# Patient Record
Sex: FEMALE | Race: WHITE | Employment: UNEMPLOYED | URBAN - METROPOLITAN AREA
[De-identification: names, ages, dates, MRNs, and addresses within clinical notes are randomized per-mention and may not be internally consistent; named-entity substitution may affect disease eponyms.]

---

## 2015-05-28 LAB
ALT SERPL-CCNC: 37 U/L (ref 7–45)
AST SERPL-CCNC: 21 U/L (ref 8–43)
HBA1C MFR BLD: 5.3 % (ref 4–5.6)
INR PPP: 1 (ref 0.9–1.1)
POTASSIUM SERPL-SCNC: 4.3 MMOL/L (ref 3.6–5.2)
TSH SERPL-ACNC: 2.5 MIU/L (ref 0.3–4.2)

## 2015-08-03 LAB — POTASSIUM SERPL-SCNC: 4 MMOL/L (ref 3.6–5.2)

## 2015-09-02 LAB
ALT SERPL-CCNC: 54 U/L (ref 7–45)
AST SERPL-CCNC: 24 U/L (ref 8–43)
CREAT SERPL-MCNC: 0.5 MG/DL (ref 0.6–1.1)
CREAT SERPL-MCNC: 0.6 MG/DL (ref 0.6–1.1)
GFR SERPL CREATININE-BSD FRML MDRD: >60 ML/MIN/1.73M2
GFR SERPL CREATININE-BSD FRML MDRD: >60 ML/MIN/1.73M2
POTASSIUM SERPL-SCNC: 4.7 MMOL/L (ref 3.6–5.2)
TSH SERPL-ACNC: 0.8 MIU/L (ref 0.3–4.2)

## 2015-11-25 LAB
ALT SERPL-CCNC: 111 U/L (ref 7–45)
AST SERPL-CCNC: 48 U/L (ref 8–43)
CREAT SERPL-MCNC: 0.6 MG/DL (ref 0.6–1.1)
GFR SERPL CREATININE-BSD FRML MDRD: >60 ML/MIN/1.73M2
INR PPP: 0.9 (ref 0.9–1.1)

## 2015-12-29 LAB
HEP C HIM: NORMAL
INR PPP: 0.9 (ref 0.9–1.1)

## 2016-03-14 LAB
ALT SERPL-CCNC: 144 U/L (ref 7–45)
AST SERPL-CCNC: 63 U/L (ref 8–43)

## 2016-10-20 LAB
CREAT SERPL-MCNC: 0.6 MG/DL (ref 0.6–1.1)
GFR SERPL CREATININE-BSD FRML MDRD: >60 ML/MIN/1.73M2

## 2017-03-27 ENCOUNTER — TRANSFERRED RECORDS (OUTPATIENT)
Dept: HEALTH INFORMATION MANAGEMENT | Facility: CLINIC | Age: 42
End: 2017-03-27

## 2017-03-27 LAB
ALT SERPL-CCNC: 32 U/L (ref 7–45)
CREAT SERPL-MCNC: 0.6 MG/DL (ref 0.6–1.1)
GFR SERPL CREATININE-BSD FRML MDRD: >60 ML/MIN/1.73M2

## 2017-04-14 ENCOUNTER — TRANSFERRED RECORDS (OUTPATIENT)
Dept: HEALTH INFORMATION MANAGEMENT | Facility: CLINIC | Age: 42
End: 2017-04-14

## 2017-10-01 ENCOUNTER — HEALTH MAINTENANCE LETTER (OUTPATIENT)
Age: 42
End: 2017-10-01

## 2017-10-20 ENCOUNTER — TRANSFERRED RECORDS (OUTPATIENT)
Dept: HEALTH INFORMATION MANAGEMENT | Facility: CLINIC | Age: 42
End: 2017-10-20

## 2017-12-29 ENCOUNTER — TRANSFERRED RECORDS (OUTPATIENT)
Dept: HEALTH INFORMATION MANAGEMENT | Facility: CLINIC | Age: 42
End: 2017-12-29

## 2018-03-15 ENCOUNTER — TRANSFERRED RECORDS (OUTPATIENT)
Dept: HEALTH INFORMATION MANAGEMENT | Facility: CLINIC | Age: 43
End: 2018-03-15

## 2018-03-15 LAB
ALT SERPL-CCNC: 22 U/L (ref 7–45)
AST SERPL-CCNC: 16 U/L (ref 8–43)
CHOLEST SERPL-MCNC: 189 MG/DL
CREAT SERPL-MCNC: 0.6 MG/DL (ref 0.6–1.1)
GFR SERPL CREATININE-BSD FRML MDRD: >60 ML/MIN/1.73M2
HBA1C MFR BLD: 5.1 % (ref 4–5.6)
HDLC SERPL-MCNC: 44 MG/DL
INR PPP: 1 (ref 0.9–1.1)
LDLC SERPL CALC-MCNC: 109 MG/DL
NONHDLC SERPL-MCNC: 145 MG/DL
TRIGL SERPL-MCNC: 182 MG/DL

## 2018-04-08 ENCOUNTER — TRANSFERRED RECORDS (OUTPATIENT)
Dept: HEALTH INFORMATION MANAGEMENT | Facility: CLINIC | Age: 43
End: 2018-04-08

## 2018-04-17 ENCOUNTER — TRANSFERRED RECORDS (OUTPATIENT)
Dept: HEALTH INFORMATION MANAGEMENT | Facility: CLINIC | Age: 43
End: 2018-04-17

## 2018-07-19 ENCOUNTER — TRANSFERRED RECORDS (OUTPATIENT)
Dept: HEALTH INFORMATION MANAGEMENT | Facility: CLINIC | Age: 43
End: 2018-07-19

## 2018-07-20 LAB
CREAT SERPL-MCNC: 0.69 MG/DL (ref 0.59–1.04)
GFR SERPL CREATININE-BSD FRML MDRD: >90 ML/MIN/1.73M2
GLUCOSE SERPL-MCNC: 97 MG/DL (ref 70–140)
POTASSIUM SERPL-SCNC: 3.8 MMOL/L (ref 3.6–5.2)

## 2018-08-14 ENCOUNTER — TRANSFERRED RECORDS (OUTPATIENT)
Dept: HEALTH INFORMATION MANAGEMENT | Facility: CLINIC | Age: 43
End: 2018-08-14

## 2018-08-14 LAB
ALT SERPL-CCNC: 60 U/L (ref 7–45)
AST SERPL-CCNC: 33 U/L (ref 8–43)
CREAT SERPL-MCNC: 0.62 MG/DL (ref 0.59–1.04)
GFR SERPL CREATININE-BSD FRML MDRD: >90 ML/MIN/1.73M2
GLUCOSE SERPL-MCNC: 103 MG/DL (ref 70–140)
POTASSIUM SERPL-SCNC: 3.8 MMOL/L (ref 3.6–5.2)

## 2018-08-22 ENCOUNTER — TELEPHONE (OUTPATIENT)
Dept: UROLOGY | Facility: CLINIC | Age: 43
End: 2018-08-22

## 2018-08-22 NOTE — TELEPHONE ENCOUNTER
This writer spoke with patient and patient states that she has been seen at [facility] for her Crohn's. Patient states she is transferring care due to drive to [place]. Patients states she will bring in records from [place] and will sign JESSENIA for [place] once in clinic    Sonido PEDROZA

## 2018-08-22 NOTE — TELEPHONE ENCOUNTER
PREVISIT INFORMATION                                                    Twyla Saravia scheduled for future visit at [facility] specialty clinics.    Patient is scheduled to see Dr Schneider  on 8/24/18  Reason for visit: Crohn's   Referring provider N/A  Has patient seen previous specialist? unknown  Medical Records:  unknown.  left for patient to call back to discuss GI history     REVIEW                                                      New patient packet mailed to patient: No  Medication reconciliation complete: No      No current outpatient prescriptions on file.       Allergies: Codeine phosphate; Demerol; Doxycycline; Flagyl [metronidazole hcl]; Morphine sulfate; Naproxen; and Shellfish allergy        PLAN/FOLLOW-UP NEEDED                                                      Previsit review complete.  Patient will see provider at future scheduled appointment.     Patient Reminders Given:  Please, make sure you bring an updated list of your medications.   If you are having a procedure, please, present 15 minutes early.  If you need to cancel or reschedule,please call [phone].    Sonido PEDROZA

## 2018-08-24 ENCOUNTER — OFFICE VISIT (OUTPATIENT)
Dept: GASTROENTEROLOGY | Facility: CLINIC | Age: 43
End: 2018-08-24
Payer: COMMERCIAL

## 2018-08-24 VITALS
HEART RATE: 76 BPM | OXYGEN SATURATION: 99 % | HEIGHT: 66 IN | BODY MASS INDEX: 21.34 KG/M2 | DIASTOLIC BLOOD PRESSURE: 77 MMHG | WEIGHT: 132.8 LBS | SYSTOLIC BLOOD PRESSURE: 119 MMHG

## 2018-08-24 DIAGNOSIS — K50.013 CROHN'S DISEASE OF SMALL INTESTINE WITH FISTULA (H): Primary | ICD-10-CM

## 2018-08-24 PROCEDURE — 99204 OFFICE O/P NEW MOD 45 MIN: CPT | Performed by: INTERNAL MEDICINE

## 2018-08-24 RX ORDER — ALBUTEROL SULFATE 90 UG/1
1 AEROSOL, METERED RESPIRATORY (INHALATION)
COMMUNITY
Start: 2017-10-20

## 2018-08-24 ASSESSMENT — PAIN SCALES - GENERAL: PAINLEVEL: NO PAIN (0)

## 2018-08-24 NOTE — PATIENT INSTRUCTIONS
Labs next Friday prior to Humira injection.    Stool studies (anytime)    CXR    MR Enterography    MR Pelvis    Colonoscopy. Call [phone] to schedule

## 2018-08-24 NOTE — PROGRESS NOTES
GASTROENTEROLOGY NEW PATIENT CLINIC VISIT    CC/REFERRING MD:    Galina Tsai  No ref. provider found    REASON FOR CONSULTATION:   No ref. provider found for   Chief Complaint   Patient presents with     Consult     Crohn's        HISTORY OF PRESENT ILLNESS:    Twyla Saravia is 43 year old female who presents for evaluation of Crohn's disease.  Previous care through [facility].  The patient reports long-standing history of fistula arising Crohn's disease with perianal involvement which was diagnosed in 2001.  She reports that initially around the time of diagnosis she was on Pentasa and subsequently underwent her first surgery to remove a diseased segment of small bowel in 2002.  She reports that she was then on no medications from 2000 2/2/2009.  She then ended up having another surgery for abscess in 2009.  In 2010 she had 6 inches of small bowel removed per her report.  In 2012 she again underwent surgery for her Crohn's disease.  She reports she was then on steroids with 10 mg prednisone for the next 4 years.  She was then on Remicade in 2015 due to ongoing symptoms of abdominal pain.  She reports doing well on this therapy, however she ended up having an ulcer in the small bowel per her report and so they took her off this medication.  She was then on Humira in April 2017 and she reports that she has had complications of Crohn's disease notably to fistula and abscess since that time.  She notes that she had low levels of Humira requiring escalated dosing to weekly Humira.  Other medications in the past included in tibia which she reports caused flulike symptoms so she discontinued this medication.  She reports also having been on as a call and 6-MP in the past but these were discontinued as they did not produce the desired effect.  She reports having been vaccinated against Pneumovax and hepatitis B in the past.  She reports current symptoms include ongoing abdominal pain which is worse in the right  lower quadrant.  She reports that she has been having rectovaginal fistulas but does not currently note any output at this time.  She continues to take Humira once per week.  However she did miss a week of therapy secondary to an upper respiratory tract infection.  She primarily wants to establish care today and is interested in potential change in therapy given her ongoing pain and fistulas.     PREVIOUS ENDOSCOPY:  Last colonoscopy 2016    PROBLEM LIST  Patient Active Problem List    Diagnosis Date Noted     Crohn's disease of small intestine (H) 2012     Priority: Medium     Previous resections in  and ; now with recurrent abdominal pain, 3-6 bowel movements per day and weight loss.          PERTINENT PAST MEDICAL HISTORY:  (I personally reviewed this history with the patient at today's visit)   Crohn's disease as noted above      PREVIOUS SURGERIES: (I personally reviewed this history with the patient at today's visit)   Past Surgical History:   Procedure Laterality Date     COLONOSCOPY       ENDOSCOPY UPPER, COLONOSCOPY, COMBINED  3/16/2012    Procedure:COMBINED ENDOSCOPY UPPER, COLONOSCOPY; Colonoscopy, EGD, crohn's; Surgeon:JERED ROB; Location:[facility] OR     GI SURGERY      colon rescetion x 2, abcess removal     GYN SURGERY           HYSTERECTOMY VAGINAL       TUBAL LIGATION           ALLERGIES:     Allergies   Allergen Reactions     Amoxicillin Shortness Of Breath     felt a little SOB after first cap was taken     No Clinical Screening - See Comments Shortness Of Breath and Itching     Other reaction(s): Fever     Meperidine Hives     Ciprofloxacin Hives     Metronidazole      Other reaction(s): Swelling, lips/tongue     Moviprep [Peg-Kcl-Nacl-Nasulf-Na Asc-C]      Shellfish-Derived Products Hives     Codeine Rash     Other reaction(s): GI Upset     Codeine Phosphate Hives, Difficulty breathing and Rash     Demerol Hives, Difficulty breathing and Rash     Doxycycline  "Hives, Difficulty breathing and Rash     Flagyl [Metronidazole Hcl] Hives, Difficulty breathing and Rash     Lidocaine Rash     Lidocaine patches     Morphine Hives, Itching and Rash     dry heaves     Morphine Sulfate Hives, Difficulty breathing and Rash     Naproxen Hives, Difficulty breathing and Rash     Shellfish Allergy Hives, Difficulty breathing and Rash       PERTINENT MEDICATIONS:    Current Outpatient Prescriptions:      adalimumab (HUMIRA) 40 MG/0.8ML pen kit, Inject 0.8 mLs Subcutaneous, Disp: , Rfl:      albuterol (PROAIR HFA/PROVENTIL HFA/VENTOLIN HFA) 108 (90 Base) MCG/ACT inhaler, Inhale 1 puff into the lungs, Disp: , Rfl:     SOCIAL HISTORY:  Social History     Social History     Marital status:      Spouse name: N/A     Number of children: N/A     Years of education: N/A     Occupational History     Not on file.     Social History Main Topics     Smoking status: Never Smoker     Smokeless tobacco: Never Used     Alcohol use No     Drug use: No     Sexual activity: Not on file     Other Topics Concern     Not on file     Social History Narrative       FAMILY HISTORY: (I personally reviewed this history with the patient at today's visit)  No family history of inflammatory bowel disease or GI malignancy      ROS:    No fevers or chills  No weight loss  No blurry vision, double vision or change in vision  No sore throat  No lymphadenopathy  No headache, paraesthesias, or weakness in a limb  No shortness of breath or wheezing  No chest pain or pressure  No arthralgias or myalgias  No rashes or skin changes  No odynophagia or dysphagia  No BRBPR, hematochezia, melena  No dysuria, frequency or urgency  No hot/cold intolerance or polyria  No anxiety or depression  PHYSICAL EXAMINATION:  Constitutional: aaox3, cooperative, pleasant, not dyspneic/diaphoretic, no acute distress  Vitals reviewed: /77 (BP Location: Left arm, Cuff Size: Adult Regular)  Pulse 76  Ht 1.676 m (5' 6\")  Wt 60.2 kg " (132 lb 12.8 oz)  SpO2 99%  BMI 21.43 kg/m2  Wt:   Wt Readings from Last 2 Encounters:   08/24/18 60.2 kg (132 lb 12.8 oz)   03/16/12 60.3 kg (133 lb)      Eyes: Sclera anicteric/injected  Ears/nose/mouth/throat: Normal oropharynx without ulcers or exudate, mucus membranes moist, hearing intact  Neck: supple, thyroid normal size  CV: No edema  Respiratory: Unlabored breathing  Lymph: No submandibular, supraclavicular or inguinal lymphadenopathy  Abd: Nondistended, no masses, +bs, no hepatosplenomegaly, nontender, no peritoneal signs  Skin: warm, perfused, no jaundice  Psych: Normal affect  MSK: Normal gait      PERTINENT STUDIES: (I personally reviewed these laboratory studies today)  Most recent CBC:   No lab results found.  Most recent hepatic panel:  No lab results found.    Invalid input(s): LULU, ALP  Most recent creatinine:  No lab results found.      ASSESSMENT/PLAN:    Twyla Saravia is a 43 year old female who presents for a new problem of Crohn's ileocolitis with perianal involvement and history of multiple surgical resections.  She is currently maintained on Humira monotherapy 40 mg once weekly.  She is symptomatic with abdominal pain and recent fistulas.  She needs reassessment of disease prior to considering any changes in therapy.  We will plan to obtain serologic workup at this time and will complete pre-biologic evaluation in anticipation she will require ongoing therapy from us.  In addition to lab work, we will obtain MR enterography of the abdomen along with MR of the pelvis.  This should give us an assessment of the degree of bowel inflammation and any active fistulas present.  She should continue Humira at the present time and we will get updated Humira levels.  We will also plan for colonoscopy for further evaluation.  If her disease is under good control, we will take dysplasia biopsies at the time of her procedure.      If this evaluation shows that her disease is under reasonable control, we  may consider adding an immunomodulator such as methotrexate subcu or Imuran orally.  If her disease is not under good control, we may consider this a failure of anti-TNF therapy and switch classes, most likely Stelara, plus immunomodulator with either methotrexate or Imuran.       Additional evaluation is required at the present time.  Data Unavailable  Orders Placed This Encounter   Procedures     MR Enterography w Contrast     MRA Pelvis wo & w Contrast     XR Chest 2 Views     CRP, inflammation     Comprehensive metabolic panel     CBC with platelets     Adalimumab Level and Lavon ([facility])     Calprotectin Feces     Hepatitis B Surface Antibody     Hepatitis B core antibody     Hepatitis B surface antigen     M Tuberculosis by Quantiferon     Thiopurine Methyltransferase RBC [INZ9010]     Tissue transglutaminase lavon IgA and IgG [JKY2010]     IgA [LAB73]     Hepatitis C antibody [GAS291]     Vitamin D deficiency screening     GASTROENTEROLOGY ADULT REF PROCEDURE ONLY [facility] [phone] (Jennie); [facility] GI       RTC for 6 weeks after above evaluation is complete    Thank you for this consultation.  It was a pleasure to participate in the care of this patient; please contact us with any further questions.  A total of 45 minutes, face to face, was spent with this patient, >50% of which was counseling regarding the above delineated issues.    This note was created with voice recognition software, and while reviewed for accuracy, typos may remain.     Sonido Schneider MD  Adjunct  of Medicine  Division of Gastroenterology, Hepatology and Nutrition  [facility]  [phone]

## 2018-08-24 NOTE — MR AVS SNAPSHOT
After Visit Summary   8/24/2018    Twyla Saravia    MRN: [id number]           Patient Information     Date Of Birth          1975        Visit Information        Provider Department      8/24/2018 9:00 AM Sonido Schneider MD [facility]        Today's Diagnoses     Crohn's disease of small intestine with fistula (H)    -  1      Care Instructions    Labs next Friday prior to Humira injection.    Stool studies (anytime)    CXR    MR Enterography    MR Pelvis    Colonoscopy           Follow-ups after your visit        Additional Services     GASTROENTEROLOGY ADULT REF PROCEDURE ONLY [facility] [phone] (Jennie); [facility] GI       Last Lab Result: No results found for: CR  Body mass index is 21.43 kg/(m^2).     Needed:  No  Language:  English    Patient will be contacted to schedule procedure.     Please be aware that coverage of these services is subject to the terms and limitations of your health insurance plan.  Call member services at your health plan with any benefit or coverage questions.  Any procedures must be performed at a [place] facility OR coordinated by your clinic's referral office.    Please bring the following with you to your appointment:    (1) Any X-Rays, CTs or MRIs which have been performed.  Contact the facility where they were done to arrange for  prior to your scheduled appointment.    (2) List of current medications   (3) This referral request   (4) Any documents/labs given to you for this referral                  Future tests that were ordered for you today     Open Future Orders        Priority Expected Expires Ordered    CRP, inflammation Routine  8/24/2019 8/24/2018    Comprehensive metabolic panel Routine  8/24/2019 8/24/2018    CBC with platelets Routine  8/24/2019 8/24/2018    Adalimumab Level and Jemima ([facility]) Routine  8/24/2019 8/24/2018    MR Enterography w Contrast Routine  8/24/2019 8/24/2018    MRA Pelvis  wo & w Contrast Routine  2019    Calprotectin Feces Routine  2019    Clostridium difficile toxin B Routine  2018    Hepatitis B Surface Antibody Routine  2019    Hepatitis B core antibody Routine  2019    Hepatitis B surface antigen Routine  2019    XR Chest 2 Views Routine 2018    M Tuberculosis by Quantiferon Routine  2019            Who to contact     If you have questions or need follow up information about today's clinic visit or your schedule please contact [facility] directly at [phone].  Normal or non-critical lab and imaging results will be communicated to you by MyChart, letter or phone within 4 business days after the clinic has received the results. If you do not hear from us within 7 days, please contact the clinic through MyChart or phone. If you have a critical or abnormal lab result, we will notify you by phone as soon as possible.  Submit refill requests through [employer] or call your pharmacy and they will forward the refill request to us. Please allow 3 business days for your refill to be completed.          Additional Information About Your Visit        [employer] Information     [employer] is an electronic gateway that provides easy, online access to your medical records. With [employer]urnQ, you can request a clinic appointment, read your test results, renew a prescription or communicate with your care team.     To sign up for [employer]urn[employer] visit the website at [email or web address]   You will be asked to enter the access code listed below, as well as some personal information. Please follow the directions to create your username and password.     Your access code is: [email or web address]  Expires: 2018  9:43 AM     Your access code will  in 90 days. If you need help or a new code, please contact your [facility] or call  "[phone] for assistance.        Care EveryWhere ID     This is your Care EveryWhere ID. This could be used by other organizations to access your [place] medical records  [id number]        Your Vitals Were     Pulse Height Pulse Oximetry BMI (Body Mass Index)          76 1.676 m (5' 6\") 99% 21.43 kg/m2         Blood Pressure from Last 3 Encounters:   08/24/18 119/77   03/16/12 119/85    Weight from Last 3 Encounters:   08/24/18 60.2 kg (132 lb 12.8 oz)   03/16/12 60.3 kg (133 lb)              We Performed the Following     GASTROENTEROLOGY ADULT REF PROCEDURE ONLY [facility] [phone] (Jennie); [facility] GI        Primary Care Provider Office Phone # Fax #    Galina Tsai [phone] [facility] [id number]        Equal Access to Services     MARY LOU [facility] CenterMANOHAR AH: Hadii aad ku hadasho Soomaali, waaxda luqadaha, qaybta kaalmada adeegyada, waxay fernandoin hayjimbon mihai whitley . So [facility] [phone].    ATENCIÓN: Si habla español, tiene a daily disposición servicios gratuitos de asistencia lingüística. Llame al [phone].    We comply with applicable federal civil rights laws and [place] laws. We do not discriminate on the basis of race, color, national origin, age, disability, sex, sexual orientation, or gender identity.            Thank you!     Thank you for choosing [facility]  for your care. Our goal is always to provide you with excellent care. Hearing back from our patients is one way we can continue to improve our services. Please take a few minutes to complete the written survey that you may receive in the mail after your visit with us. Thank you!             Your Updated Medication List - Protect others around you: Learn how to safely use, store and throw away your medicines at www.disposemymeds.org.          This list is accurate as of 8/24/18  9:43 AM.  Always use your most recent med list.                   Brand Name Dispense " Instructions for use Diagnosis    adalimumab 40 MG/0.8ML pen kit    HUMIRA     Inject 0.8 mLs Subcutaneous        albuterol 108 (90 Base) MCG/ACT inhaler    PROAIR HFA/PROVENTIL HFA/VENTOLIN HFA     Inhale 1 puff into the lungs

## 2018-10-29 ENCOUNTER — TELEPHONE (OUTPATIENT)
Dept: GASTROENTEROLOGY | Facility: CLINIC | Age: 43
End: 2018-10-29

## 2018-10-29 NOTE — TELEPHONE ENCOUNTER
Pt saw Dr. Schneider on 8/24/18 for Crohn's, labs and procedure orders have not been completed. Nurse called patient to follow up and see if she needs any assistance with scheduling. Left message on VM to return call.    Cynthia Unger RN, BSN, PHN  [facility] [place]  GI/Gen Surg/Hepatology Care Coordinator

## 2018-10-30 NOTE — TELEPHONE ENCOUNTER
Message left for patient to see if assistance is need to schedule lab work, imaging, and procedure with Dr Schneider that was ordered on 8/24/18 visit. Patient asked to call back     Sonido PEDROZA

## 2018-11-02 NOTE — TELEPHONE ENCOUNTER
Nurse called and left a message to schedule recommendations of Dr. Schneider and number to return call.    Cynthia Unger RN, BSN, PHN  [facility]  GI/Gen Surg/Hepatology Care Coordinator

## 2024-08-21 ENCOUNTER — TRANSFERRED RECORDS (OUTPATIENT)
Dept: HEALTH INFORMATION MANAGEMENT | Facility: CLINIC | Age: 49
End: 2024-08-21
Payer: COMMERCIAL

## 2024-09-03 ENCOUNTER — TRANSCRIBE ORDERS (OUTPATIENT)
Dept: OTHER | Age: 49
End: 2024-09-03

## 2024-09-03 DIAGNOSIS — R55 FAINTING: Primary | ICD-10-CM
